# Patient Record
Sex: MALE | Race: WHITE | NOT HISPANIC OR LATINO | Employment: FULL TIME | URBAN - METROPOLITAN AREA
[De-identification: names, ages, dates, MRNs, and addresses within clinical notes are randomized per-mention and may not be internally consistent; named-entity substitution may affect disease eponyms.]

---

## 2019-02-25 ENCOUNTER — TELEPHONE (OUTPATIENT)
Dept: FAMILY MEDICINE | Facility: CLINIC | Age: 24
End: 2019-02-25

## 2019-02-25 NOTE — TELEPHONE ENCOUNTER
----- Message from Lita Rawls sent at 2/25/2019  4:12 PM CST -----  Regarding: RX REFILL  Contact: [phone]  dextroamphetamine-amphetamine ER 30 mg 24hr capsule,extend release

## 2019-04-12 RX ORDER — DEXTROAMPHETAMINE SACCHARATE, AMPHETAMINE ASPARTATE MONOHYDRATE, DEXTROAMPHETAMINE SULFATE AND AMPHETAMINE SULFATE 7.5; 7.5; 7.5; 7.5 MG/1; MG/1; MG/1; MG/1
30 CAPSULE, EXTENDED RELEASE ORAL EVERY MORNING
Qty: 30 CAPSULE | Refills: 0 | Status: SHIPPED | OUTPATIENT
Start: 2019-04-12 | End: 2019-05-29 | Stop reason: SDUPTHER

## 2019-05-29 ENCOUNTER — TELEPHONE (OUTPATIENT)
Dept: FAMILY MEDICINE | Facility: CLINIC | Age: 24
End: 2019-05-29

## 2019-05-29 RX ORDER — DEXTROAMPHETAMINE SACCHARATE, AMPHETAMINE ASPARTATE MONOHYDRATE, DEXTROAMPHETAMINE SULFATE AND AMPHETAMINE SULFATE 7.5; 7.5; 7.5; 7.5 MG/1; MG/1; MG/1; MG/1
30 CAPSULE, EXTENDED RELEASE ORAL EVERY MORNING
Qty: 30 CAPSULE | Refills: 0 | Status: SHIPPED | OUTPATIENT
Start: 2019-05-29 | End: 2019-07-10 | Stop reason: SDUPTHER

## 2019-07-10 ENCOUNTER — TELEPHONE (OUTPATIENT)
Dept: FAMILY MEDICINE | Facility: CLINIC | Age: 24
End: 2019-07-10

## 2019-07-10 DIAGNOSIS — F90.0 ADHD (ATTENTION DEFICIT HYPERACTIVITY DISORDER), INATTENTIVE TYPE: Primary | ICD-10-CM

## 2019-07-10 RX ORDER — DEXTROAMPHETAMINE SACCHARATE, AMPHETAMINE ASPARTATE MONOHYDRATE, DEXTROAMPHETAMINE SULFATE AND AMPHETAMINE SULFATE 7.5; 7.5; 7.5; 7.5 MG/1; MG/1; MG/1; MG/1
30 CAPSULE, EXTENDED RELEASE ORAL EVERY MORNING
Qty: 30 CAPSULE | Refills: 0 | Status: SHIPPED | OUTPATIENT
Start: 2019-07-10 | End: 2019-08-12 | Stop reason: SDUPTHER

## 2019-08-12 ENCOUNTER — OFFICE VISIT (OUTPATIENT)
Dept: FAMILY MEDICINE | Facility: CLINIC | Age: 24
End: 2019-08-12
Payer: COMMERCIAL

## 2019-08-12 VITALS
WEIGHT: 218 LBS | RESPIRATION RATE: 16 BRPM | SYSTOLIC BLOOD PRESSURE: 122 MMHG | HEART RATE: 64 BPM | DIASTOLIC BLOOD PRESSURE: 71 MMHG | BODY MASS INDEX: 32.29 KG/M2 | OXYGEN SATURATION: 98 % | TEMPERATURE: 99 F | HEIGHT: 69 IN

## 2019-08-12 DIAGNOSIS — F90.0 ADHD (ATTENTION DEFICIT HYPERACTIVITY DISORDER), INATTENTIVE TYPE: ICD-10-CM

## 2019-08-12 DIAGNOSIS — R10.84 ABDOMINAL DISCOMFORT, GENERALIZED: Primary | ICD-10-CM

## 2019-08-12 DIAGNOSIS — R14.2 ERUCTATION: ICD-10-CM

## 2019-08-12 PROCEDURE — 99213 PR OFFICE/OUTPT VISIT, EST, LEVL III, 20-29 MIN: ICD-10-PCS | Mod: S$GLB,,, | Performed by: NURSE PRACTITIONER

## 2019-08-12 PROCEDURE — 99213 OFFICE O/P EST LOW 20 MIN: CPT | Mod: S$GLB,,, | Performed by: NURSE PRACTITIONER

## 2019-08-12 PROCEDURE — 3008F PR BODY MASS INDEX (BMI) DOCUMENTED: ICD-10-PCS | Mod: CPTII,S$GLB,, | Performed by: NURSE PRACTITIONER

## 2019-08-12 PROCEDURE — 3008F BODY MASS INDEX DOCD: CPT | Mod: CPTII,S$GLB,, | Performed by: NURSE PRACTITIONER

## 2019-08-12 RX ORDER — DEXTROAMPHETAMINE SACCHARATE, AMPHETAMINE ASPARTATE MONOHYDRATE, DEXTROAMPHETAMINE SULFATE AND AMPHETAMINE SULFATE 7.5; 7.5; 7.5; 7.5 MG/1; MG/1; MG/1; MG/1
30 CAPSULE, EXTENDED RELEASE ORAL EVERY MORNING
Qty: 30 CAPSULE | Refills: 0 | Status: SHIPPED | OUTPATIENT
Start: 2019-08-12 | End: 2020-01-14 | Stop reason: SDUPTHER

## 2019-08-12 RX ORDER — DICYCLOMINE HYDROCHLORIDE 20 MG/1
20 TABLET ORAL 4 TIMES DAILY PRN
Qty: 30 TABLET | Refills: 0 | Status: SHIPPED | OUTPATIENT
Start: 2019-08-12 | End: 2019-09-11

## 2019-08-12 RX ORDER — ONDANSETRON 8 MG/1
8 TABLET, ORALLY DISINTEGRATING ORAL 3 TIMES DAILY PRN
Qty: 6 TABLET | Refills: 0 | Status: SHIPPED | OUTPATIENT
Start: 2019-08-12 | End: 2022-03-18

## 2019-08-12 NOTE — PROGRESS NOTES
Subjective:      Patient ID: Marc Gonzalez is a 24 y.o. male.    Chief Complaint: Abdominal Pain (Started last night. No stomach virus symptoms- feels like a pressure in mid abd.)      Patient is here for epigastric discomfort that started last night.  He reports pain that felt like pressure in the epigastric region.  The pain has improved significantly.  Associated symptoms include belching last night.  Denies diarrhea, fever, chills, body aches.  He denies NSAID use.  Denies EtOH use. No recent sick contacts. BM x 2 this am. Denies flatulence. Denies GERD. Has not eaten today d/t being nauseated. Denies flank pain.       Requesting adderall refill.       ROS:   Review of Systems   Constitutional: Negative.    Respiratory: Negative.    Cardiovascular: Negative.    Gastrointestinal: Positive for nausea. Negative for abdominal distention, anal bleeding, blood in stool, constipation, diarrhea, rectal pain and vomiting.   Genitourinary: Negative.    Musculoskeletal: Negative.    Skin: Negative.      Objective:   Physical Exam   Constitutional: He is oriented to person, place, and time. Vital signs are normal. He appears well-developed and well-nourished. He is active.  Non-toxic appearance. He does not have a sickly appearance. He does not appear ill. No distress.   HENT:   Head: Normocephalic and atraumatic.   Right Ear: Hearing and tympanic membrane normal.   Left Ear: Hearing and tympanic membrane normal.   Nose: Nose normal.   Mouth/Throat: Uvula is midline, oropharynx is clear and moist and mucous membranes are normal.   Eyes: Pupils are equal, round, and reactive to light. Conjunctivae and EOM are normal. No scleral icterus.   Neck: Full passive range of motion without pain. Neck supple. Carotid bruit is not present. No thyroid mass and no thyromegaly present.   Cardiovascular: Normal rate, regular rhythm and normal heart sounds.   Pulmonary/Chest: Effort normal and breath sounds normal.   Abdominal: Soft.  Normal appearance. He exhibits no distension and no ascites. Bowel sounds are increased. There is no hepatosplenomegaly. There is generalized tenderness. There is no rigidity, no rebound, no guarding, no CVA tenderness, no tenderness at McBurney's point and negative Craven's sign. No hernia.   Musculoskeletal: Normal range of motion. He exhibits no edema.   Lymphadenopathy:     He has no cervical adenopathy.        Right: No supraclavicular adenopathy present.        Left: No supraclavicular adenopathy present.   Neurological: He is alert and oriented to person, place, and time. He has normal strength.   Skin: Skin is warm, dry and intact. Capillary refill takes less than 2 seconds.   Psychiatric: He has a normal mood and affect. His speech is normal and behavior is normal. Judgment and thought content normal.   Nursing note and vitals reviewed.    Assessment:     1. Abdominal discomfort, generalized    2. Eructation    3. ADHD (attention deficit hyperactivity disorder), inattentive type      Plan:     Problem List Items Addressed This Visit     None      Visit Diagnoses     Abdominal discomfort, generalized    -  Primary    Suspect viral. Will treat symptoms. Instructed to RTC if symptoms do not improve by Friday or if RLQ pain or fever develops.     Relevant Medications    dicyclomine (BENTYL) 20 mg tablet    ondansetron (ZOFRAN-ODT) 8 MG TbDL    Eructation        Continue diet as tolerated. Leivasy.     ADHD (attention deficit hyperactivity disorder), inattentive type        Relevant Medications    dextroamphetamine-amphetamine (ADDERALL XR) 30 MG 24 hr capsule

## 2019-08-12 NOTE — LETTER
August 12, 2019      [facility]  [address] [facility] [address]  Phone: [phone]  Fax: [phone]       Patient: Marc Gonzalez   YOB: 1995  Date of Visit: 08/12/2019    To Whom It May Concern:    Renetta Gonzalez  was at [facility] on 08/12/2019. He may return to work on 08/13/2019 with no restrictions. If you have any questions or concerns, or if I can be of further assistance, please do not hesitate to contact me.    Sincerely,    Sara Darling MA

## 2020-01-14 ENCOUNTER — OFFICE VISIT (OUTPATIENT)
Dept: FAMILY MEDICINE | Facility: CLINIC | Age: 25
End: 2020-01-14
Payer: COMMERCIAL

## 2020-01-14 VITALS
RESPIRATION RATE: 16 BRPM | DIASTOLIC BLOOD PRESSURE: 60 MMHG | WEIGHT: 223 LBS | HEIGHT: 69 IN | SYSTOLIC BLOOD PRESSURE: 120 MMHG | BODY MASS INDEX: 33.03 KG/M2

## 2020-01-14 DIAGNOSIS — F90.0 ADHD (ATTENTION DEFICIT HYPERACTIVITY DISORDER), INATTENTIVE TYPE: ICD-10-CM

## 2020-01-14 PROBLEM — E83.52 HYPERCALCEMIA: Status: RESOLVED | Noted: 2020-01-14 | Resolved: 2020-01-14

## 2020-01-14 PROBLEM — R79.9 ABNORMAL SERUM TOTAL PROTEIN LEVEL: Status: RESOLVED | Noted: 2020-01-14 | Resolved: 2020-01-14

## 2020-01-14 PROCEDURE — 3008F PR BODY MASS INDEX (BMI) DOCUMENTED: ICD-10-PCS | Mod: CPTII,S$GLB,, | Performed by: NURSE PRACTITIONER

## 2020-01-14 PROCEDURE — 99213 OFFICE O/P EST LOW 20 MIN: CPT | Mod: S$GLB,,, | Performed by: NURSE PRACTITIONER

## 2020-01-14 PROCEDURE — 3008F BODY MASS INDEX DOCD: CPT | Mod: CPTII,S$GLB,, | Performed by: NURSE PRACTITIONER

## 2020-01-14 PROCEDURE — 99213 PR OFFICE/OUTPT VISIT, EST, LEVL III, 20-29 MIN: ICD-10-PCS | Mod: S$GLB,,, | Performed by: NURSE PRACTITIONER

## 2020-01-14 RX ORDER — DEXTROAMPHETAMINE SACCHARATE, AMPHETAMINE ASPARTATE MONOHYDRATE, DEXTROAMPHETAMINE SULFATE AND AMPHETAMINE SULFATE 7.5; 7.5; 7.5; 7.5 MG/1; MG/1; MG/1; MG/1
30 CAPSULE, EXTENDED RELEASE ORAL EVERY MORNING
Qty: 30 CAPSULE | Refills: 0 | Status: SHIPPED | OUTPATIENT
Start: 2020-01-14 | End: 2020-05-26 | Stop reason: SDUPTHER

## 2020-01-14 NOTE — ASSESSMENT & PLAN NOTE
No side effects from medicine.  Patient is doing well on 30 mg Adderall XR capsule.  He will return to clinic in 1 year.

## 2020-01-14 NOTE — PROGRESS NOTES
Subjective:      Patient ID: Marc Gonzalez is a 24 y.o. male.    Chief Complaint: Follow-up      24-year-old male with a history of ADHD here for his yearly follow-up.  He reports no acute issues at this time.  He is currently on Adderall 30 mg XR capsule.  He reports no side effects from the medication.  Maintaining a healthy weight and diet.  Denies chest pain, palpitations, decreased appetite, constipation, diarrhea.  No acute issues reported this time.     Past Medical History:   Diagnosis Date    Abnormal serum total protein level     ADHD (attention deficit hyperactivity disorder), inattentive type     Hypercalcemia       Social History     Socioeconomic History    Marital status:      Spouse name: Not on file    Number of children: Not on file    Years of education: Not on file    Highest education level: Not on file   Occupational History    Not on file   Social Needs    Financial resource strain: Not on file    Food insecurity:     Worry: Not on file     Inability: Not on file    Transportation needs:     Medical: Not on file     Non-medical: Not on file   Tobacco Use    Smoking status: Never Smoker    Smokeless tobacco: Never Used   Substance and Sexual Activity    Alcohol use: Yes     Frequency: Monthly or less    Drug use: Never    Sexual activity: Not on file   Lifestyle    Physical activity:     Days per week: Not on file     Minutes per session: Not on file    Stress: Not on file   Relationships    Social connections:     Talks on phone: Not on file     Gets together: Not on file     Attends Shinto service: Not on file     Active member of club or organization: Not on file     Attends meetings of clubs or organizations: Not on file     Relationship status: Not on file   Other Topics Concern    Not on file   Social History Narrative    Not on file      Family History   Problem Relation Age of Onset    No Known Problems Mother     No Known Problems Father     No  Known Problems Sister     No Known Problems Brother     No Known Problems Maternal Grandmother     No Known Problems Maternal Grandfather     No Known Problems Paternal Grandmother     No Known Problems Paternal Grandfather         ROS:   Review of Systems   Constitutional: Negative for activity change, appetite change, chills, fatigue, fever and unexpected weight change.   HENT: Negative for congestion, dental problem, hearing loss, mouth sores, sore throat, trouble swallowing and voice change.    Eyes: Negative for pain, redness and visual disturbance.   Respiratory: Negative for cough, shortness of breath and wheezing.    Cardiovascular: Negative for chest pain and leg swelling.   Gastrointestinal: Negative for abdominal distention, abdominal pain, diarrhea, nausea and vomiting.   Endocrine: Negative for polyuria.   Genitourinary: Negative for decreased urine volume, dysuria and frequency.   Musculoskeletal: Negative for joint swelling and neck stiffness.   Skin: Negative for rash.   Allergic/Immunologic: Negative for immunocompromised state.   Neurological: Negative for dizziness, weakness and headaches.   Hematological: Negative for adenopathy.   Psychiatric/Behavioral: Negative for agitation, confusion, hallucinations and suicidal ideas. The patient is not nervous/anxious.    All other systems reviewed and are negative.    Objective:   Physical Exam   Constitutional: He is oriented to person, place, and time. He appears well-developed and well-nourished. No distress.   HENT:   Head: Normocephalic and atraumatic.   Nose: Nose normal.   Mouth/Throat: Oropharynx is clear and moist.   Eyes: Pupils are equal, round, and reactive to light. No scleral icterus.   Neck: Normal range of motion. Carotid bruit is not present. No thyromegaly present.   Cardiovascular: Normal rate and normal heart sounds.   No murmur heard.  Pulmonary/Chest: Effort normal and breath sounds normal.   Musculoskeletal: He exhibits no  edema.   Neurological: He is alert and oriented to person, place, and time.   Skin: Skin is warm.   Psychiatric: He has a normal mood and affect. His speech is normal and behavior is normal. Judgment and thought content normal. Cognition and memory are normal.   Nursing note and vitals reviewed.    Assessment:     1. ADHD (attention deficit hyperactivity disorder), inattentive type      Plan:     Problem List Items Addressed This Visit        Psychiatric    ADHD (attention deficit hyperactivity disorder), inattentive type    Current Assessment & Plan     No side effects from medicine.  Patient is doing well on 30 mg Adderall XR capsule.  He will return to clinic in 1 year.          Relevant Medications    dextroamphetamine-amphetamine (ADDERALL XR) 30 MG 24 hr capsule          Follow up:     There are no Patient Instructions on file for this visit.     Follow up in about 1 year (around 1/14/2021), or if symptoms worsen or fail to improve.

## 2020-05-26 ENCOUNTER — OFFICE VISIT (OUTPATIENT)
Dept: FAMILY MEDICINE | Facility: CLINIC | Age: 25
End: 2020-05-26
Payer: COMMERCIAL

## 2020-05-26 VITALS
DIASTOLIC BLOOD PRESSURE: 72 MMHG | SYSTOLIC BLOOD PRESSURE: 102 MMHG | RESPIRATION RATE: 16 BRPM | BODY MASS INDEX: 28.66 KG/M2 | WEIGHT: 204.69 LBS | HEIGHT: 71 IN | TEMPERATURE: 98 F | HEART RATE: 87 BPM | OXYGEN SATURATION: 98 %

## 2020-05-26 DIAGNOSIS — B34.9 VIRAL SYNDROME: ICD-10-CM

## 2020-05-26 DIAGNOSIS — F90.0 ADHD (ATTENTION DEFICIT HYPERACTIVITY DISORDER), INATTENTIVE TYPE: ICD-10-CM

## 2020-05-26 DIAGNOSIS — F90.9 ATTENTION DEFICIT HYPERACTIVITY DISORDER (ADHD), UNSPECIFIED ADHD TYPE: ICD-10-CM

## 2020-05-26 DIAGNOSIS — R68.89 FLU-LIKE SYMPTOMS: Primary | ICD-10-CM

## 2020-05-26 PROCEDURE — 99214 PR OFFICE/OUTPT VISIT, EST, LEVL IV, 30-39 MIN: ICD-10-PCS | Mod: S$GLB,,, | Performed by: NURSE PRACTITIONER

## 2020-05-26 PROCEDURE — 3008F BODY MASS INDEX DOCD: CPT | Mod: CPTII,S$GLB,, | Performed by: NURSE PRACTITIONER

## 2020-05-26 PROCEDURE — 99214 OFFICE O/P EST MOD 30 MIN: CPT | Mod: S$GLB,,, | Performed by: NURSE PRACTITIONER

## 2020-05-26 PROCEDURE — 3008F PR BODY MASS INDEX (BMI) DOCUMENTED: ICD-10-PCS | Mod: CPTII,S$GLB,, | Performed by: NURSE PRACTITIONER

## 2020-05-26 RX ORDER — DEXTROAMPHETAMINE SACCHARATE, AMPHETAMINE ASPARTATE MONOHYDRATE, DEXTROAMPHETAMINE SULFATE AND AMPHETAMINE SULFATE 7.5; 7.5; 7.5; 7.5 MG/1; MG/1; MG/1; MG/1
30 CAPSULE, EXTENDED RELEASE ORAL EVERY MORNING
Qty: 30 CAPSULE | Refills: 0 | Status: SHIPPED | OUTPATIENT
Start: 2020-05-26 | End: 2020-07-06 | Stop reason: SDUPTHER

## 2020-05-26 NOTE — PROGRESS NOTES
Subjective:      Patient ID: Marc Gonzalez is a 25 y.o. male.    Chief Complaint: Fever      25-year-old male here today with complaints of flu-like symptoms.  He reports body aches for the past day and fever.  Fever was 101 yesterday.  Has been taking Tylenol since that time.  No fever today but has taken Tylenol.  Denies cough, chills, sore throat, rhinorrhea.  Positive for myalgia and lower back pain.  Otherwise no other issues reported.     States that his wife was recently tested for COVID as well as his kid.  Neither tested positive for flu or strep.  He reports taking 2 doses of azithromycin from his wife yesterday.       Also requesting refill for Adderall    Past Medical History:   Diagnosis Date    Abnormal serum total protein level     ADHD (attention deficit hyperactivity disorder), inattentive type     Hypercalcemia       Social History     Socioeconomic History    Marital status:      Spouse name: Not on file    Number of children: Not on file    Years of education: Not on file    Highest education level: Not on file   Occupational History    Not on file   Social Needs    Financial resource strain: Not on file    Food insecurity:     Worry: Not on file     Inability: Not on file    Transportation needs:     Medical: Not on file     Non-medical: Not on file   Tobacco Use    Smoking status: Never Smoker    Smokeless tobacco: Never Used   Substance and Sexual Activity    Alcohol use: Yes     Frequency: Monthly or less    Drug use: Never    Sexual activity: Not on file   Lifestyle    Physical activity:     Days per week: Not on file     Minutes per session: Not on file    Stress: Not on file   Relationships    Social connections:     Talks on phone: Not on file     Gets together: Not on file     Attends Yazidism service: Not on file     Active member of club or organization: Not on file     Attends meetings of clubs or organizations: Not on file     Relationship status: Not  on file   Other Topics Concern    Not on file   Social History Narrative    Not on file      Family History   Problem Relation Age of Onset    No Known Problems Mother     No Known Problems Father     No Known Problems Sister     No Known Problems Brother     No Known Problems Maternal Grandmother     No Known Problems Maternal Grandfather     No Known Problems Paternal Grandmother     No Known Problems Paternal Grandfather         ROS:   Review of Systems   Constitutional: Positive for fatigue and fever. Negative for activity change, appetite change, chills, diaphoresis and unexpected weight change.   HENT: Negative for congestion, dental problem, hearing loss, mouth sores, sore throat, trouble swallowing and voice change.    Eyes: Negative for pain, redness and visual disturbance.   Respiratory: Negative for cough, shortness of breath and wheezing.    Cardiovascular: Negative for chest pain and leg swelling.   Gastrointestinal: Negative for abdominal distention, abdominal pain, diarrhea, nausea and vomiting.   Endocrine: Negative for polyuria.   Genitourinary: Negative for decreased urine volume, dysuria and frequency.   Musculoskeletal: Positive for back pain and myalgias. Negative for joint swelling and neck stiffness.   Skin: Negative for rash.   Allergic/Immunologic: Negative for immunocompromised state.   Neurological: Negative for dizziness, weakness and headaches.   Hematological: Negative for adenopathy.   Psychiatric/Behavioral: Negative for agitation, confusion, hallucinations and suicidal ideas. The patient is not nervous/anxious.    All other systems reviewed and are negative.    Objective:   Physical Exam   Constitutional: He is oriented to person, place, and time. Vital signs are normal. He appears well-developed and well-nourished. He is active and cooperative.  Non-toxic appearance. He does not have a sickly appearance. He does not appear ill. No distress.   HENT:   Head: Normocephalic  and atraumatic.   Nose: Nose normal.   Mouth/Throat: Oropharynx is clear and moist. No oropharyngeal exudate.   Eyes: Pupils are equal, round, and reactive to light. No scleral icterus.   Neck: Normal range of motion. Carotid bruit is not present. No thyromegaly present.   Cardiovascular: Normal rate and normal heart sounds.   No murmur heard.  Pulmonary/Chest: Effort normal and breath sounds normal.   Musculoskeletal: He exhibits no edema.   Neurological: He is alert and oriented to person, place, and time.   Skin: Skin is warm.   Psychiatric: He has a normal mood and affect. His speech is normal and behavior is normal. Judgment and thought content normal. Cognition and memory are normal.   Nursing note and vitals reviewed.    Assessment:     1. Flu-like symptoms    2. Viral syndrome    3. Attention deficit hyperactivity disorder (ADHD), unspecified ADHD type    4. ADHD (attention deficit hyperactivity disorder), inattentive type      No images are attached to the encounter.   Plan:     Problem List Items Addressed This Visit        Psychiatric    ADHD (attention deficit hyperactivity disorder), inattentive type    Relevant Medications    dextroamphetamine-amphetamine (ADDERALL XR) 30 MG 24 hr capsule      Other Visit Diagnoses     Flu-like symptoms    -  Primary    Relevant Orders    POCT Rapid Strep A    POCT Influenza A/B    COVID-19 Routine Screening    Viral syndrome        Attention deficit hyperactivity disorder (ADHD), unspecified ADHD type           Patient was negative for flu.  Negative for strep.  Doubtful that he has COVID-19, however, tested him regardless.  He was instructed to quarantine until his results have come back.  Will notify him once they are back.  Patient given work excuse.  Patient instructed to return to clinic if further symptoms develop.      Follow up:     There are no Patient Instructions on file for this visit.     Follow up if symptoms worsen or fail to improve.

## 2020-06-01 ENCOUNTER — TELEPHONE (OUTPATIENT)
Dept: FAMILY MEDICINE | Facility: CLINIC | Age: 25
End: 2020-06-01

## 2020-06-01 NOTE — TELEPHONE ENCOUNTER
Pt notified.    Valentino Hinson NP   Sent: Mon June 01, 2020  1:10 PM   To: Sara Darling MA          Message     Negative COVID. Needs to return to work.

## 2020-07-06 DIAGNOSIS — F90.0 ADHD (ATTENTION DEFICIT HYPERACTIVITY DISORDER), INATTENTIVE TYPE: ICD-10-CM

## 2020-07-06 RX ORDER — DEXTROAMPHETAMINE SACCHARATE, AMPHETAMINE ASPARTATE MONOHYDRATE, DEXTROAMPHETAMINE SULFATE AND AMPHETAMINE SULFATE 7.5; 7.5; 7.5; 7.5 MG/1; MG/1; MG/1; MG/1
30 CAPSULE, EXTENDED RELEASE ORAL EVERY MORNING
Qty: 30 CAPSULE | Refills: 0 | Status: SHIPPED | OUTPATIENT
Start: 2020-07-06 | End: 2020-08-14 | Stop reason: SDUPTHER

## 2020-07-06 NOTE — TELEPHONE ENCOUNTER
----- Message from Robson Barton sent at 7/6/2020 10:21 AM CDT -----  Regarding: Rx  Contact: PAIGE CANALES [[id number]]  Type:  RX Refill Request    Who Called: pt  Refill or New Rx:refill  RX Name and Strength:dextroamphetamine-amphetamine (ADDERALL XR) 30 MG 24 hr capsule  How is the patient currently taking it? (ex. 1XDay):1x  Is this a 30 day or 90 day RX: 30 day  Preferred Pharmacy with phone number:  [facility] [address] [place], [place] - [address] [address]  Phone: [phone] Fax: [phone]  Local or Mail Order:local   Ordering Provider:Joya  Would the patient rather a call back or a response via [facility]? Call back  Best Call Back Number:[phone]  Additional Information:

## 2020-08-14 DIAGNOSIS — F90.0 ADHD (ATTENTION DEFICIT HYPERACTIVITY DISORDER), INATTENTIVE TYPE: Primary | ICD-10-CM

## 2020-08-14 RX ORDER — DEXTROAMPHETAMINE SACCHARATE, AMPHETAMINE ASPARTATE MONOHYDRATE, DEXTROAMPHETAMINE SULFATE AND AMPHETAMINE SULFATE 7.5; 7.5; 7.5; 7.5 MG/1; MG/1; MG/1; MG/1
30 CAPSULE, EXTENDED RELEASE ORAL EVERY MORNING
Qty: 30 CAPSULE | Refills: 0 | Status: SHIPPED | OUTPATIENT
Start: 2020-08-14 | End: 2020-09-28 | Stop reason: SDUPTHER

## 2020-08-14 NOTE — TELEPHONE ENCOUNTER
----- Message from Ashkan Pierce sent at 8/14/2020 10:39 AM CDT -----  Regarding: Refill  Type:  RX Refill Request    Who Called: Marc  Refill or New Rx:Refill  RX Name and Strength:Adderall 30mg  How is the patient currently taking it? (ex. 1XDay):1xday  Is this a 30 day or 90 day RX:30  Preferred Pharmacy with phone number:  [facility], [place] - [address] [facility]  [address] [facility] [address]  Phone: [phone] Fax: [phone]  Local or Mail Order:Local  Ordering Provider:Karthik Hinson  Would the patient rather a call back or a response via [facility]? Call back  Best Call Back Number:[phone] (home)   Additional Information: na

## 2020-09-28 DIAGNOSIS — F90.0 ADHD (ATTENTION DEFICIT HYPERACTIVITY DISORDER), INATTENTIVE TYPE: ICD-10-CM

## 2020-09-28 RX ORDER — DEXTROAMPHETAMINE SACCHARATE, AMPHETAMINE ASPARTATE MONOHYDRATE, DEXTROAMPHETAMINE SULFATE AND AMPHETAMINE SULFATE 7.5; 7.5; 7.5; 7.5 MG/1; MG/1; MG/1; MG/1
30 CAPSULE, EXTENDED RELEASE ORAL EVERY MORNING
Qty: 30 CAPSULE | Refills: 0 | Status: SHIPPED | OUTPATIENT
Start: 2020-09-28 | End: 2020-11-18 | Stop reason: SDUPTHER

## 2020-09-28 NOTE — TELEPHONE ENCOUNTER
----- Message from Jamia Morales sent at 9/24/2020  9:55 AM CDT -----  Regarding: patient refill  Contact: patient  Type:  RX Refill Request    Who Called: PATIENT  Refill or New Rx:REFILL  RX Name and Strength: ADDERALL 30MG  How is the patient currently taking it? (ex. 1XDay):1XDAY  Is this a 30 day or 90 day RX: 30DAY  Preferred Pharmacy with phone number:[facility]  Local or Mail Order:LOCAL  Ordering Provider:Caitlin Mckeon  Would the patient rather a call back or a response via [facility]? CALL BACK  Best Call Back Number:[phone]   Additional Information: N/A

## 2020-11-18 DIAGNOSIS — F90.0 ADHD (ATTENTION DEFICIT HYPERACTIVITY DISORDER), INATTENTIVE TYPE: ICD-10-CM

## 2020-11-18 RX ORDER — DEXTROAMPHETAMINE SACCHARATE, AMPHETAMINE ASPARTATE MONOHYDRATE, DEXTROAMPHETAMINE SULFATE AND AMPHETAMINE SULFATE 7.5; 7.5; 7.5; 7.5 MG/1; MG/1; MG/1; MG/1
30 CAPSULE, EXTENDED RELEASE ORAL EVERY MORNING
Qty: 30 CAPSULE | Refills: 0 | Status: SHIPPED | OUTPATIENT
Start: 2020-11-18 | End: 2021-01-11 | Stop reason: SDUPTHER

## 2020-11-18 NOTE — TELEPHONE ENCOUNTER
----- Message from Jamia Morales sent at 11/18/2020 10:30 AM CST -----  Regarding: patient refill  Contact: patient  Type:  RX Refill Request    Who Called: patient  Refill or New Rx:refill  RX Name and Strength: Adderall 30mg  How is the patient currently taking it? (ex. 1XDay):1xday  Is this a 30 day or 90 day RX:30day  Preferred Pharmacy with phone number: [facility] in [place]  Local or Mail Order:local  Ordering Provider:Valentino Hinson  Would the patient rather a call back or a response via [facility]? Call back  Best Call Back Number:[phone]   Additional Information: n/a

## 2021-01-11 DIAGNOSIS — F90.0 ADHD (ATTENTION DEFICIT HYPERACTIVITY DISORDER), INATTENTIVE TYPE: ICD-10-CM

## 2021-01-11 RX ORDER — DEXTROAMPHETAMINE SACCHARATE, AMPHETAMINE ASPARTATE MONOHYDRATE, DEXTROAMPHETAMINE SULFATE AND AMPHETAMINE SULFATE 7.5; 7.5; 7.5; 7.5 MG/1; MG/1; MG/1; MG/1
30 CAPSULE, EXTENDED RELEASE ORAL EVERY MORNING
Qty: 30 CAPSULE | Refills: 0 | Status: SHIPPED | OUTPATIENT
Start: 2021-01-11 | End: 2021-03-04 | Stop reason: SDUPTHER

## 2021-03-04 DIAGNOSIS — F90.0 ADHD (ATTENTION DEFICIT HYPERACTIVITY DISORDER), INATTENTIVE TYPE: ICD-10-CM

## 2021-03-04 RX ORDER — DEXTROAMPHETAMINE SACCHARATE, AMPHETAMINE ASPARTATE MONOHYDRATE, DEXTROAMPHETAMINE SULFATE AND AMPHETAMINE SULFATE 7.5; 7.5; 7.5; 7.5 MG/1; MG/1; MG/1; MG/1
30 CAPSULE, EXTENDED RELEASE ORAL EVERY MORNING
Qty: 30 CAPSULE | Refills: 0 | Status: SHIPPED | OUTPATIENT
Start: 2021-03-04 | End: 2021-04-15 | Stop reason: SDUPTHER

## 2021-04-15 DIAGNOSIS — F90.0 ADHD (ATTENTION DEFICIT HYPERACTIVITY DISORDER), INATTENTIVE TYPE: ICD-10-CM

## 2021-04-15 RX ORDER — DEXTROAMPHETAMINE SACCHARATE, AMPHETAMINE ASPARTATE MONOHYDRATE, DEXTROAMPHETAMINE SULFATE AND AMPHETAMINE SULFATE 7.5; 7.5; 7.5; 7.5 MG/1; MG/1; MG/1; MG/1
30 CAPSULE, EXTENDED RELEASE ORAL EVERY MORNING
Qty: 30 CAPSULE | Refills: 0 | Status: SHIPPED | OUTPATIENT
Start: 2021-04-15 | End: 2021-05-28 | Stop reason: SDUPTHER

## 2021-05-28 DIAGNOSIS — F90.0 ADHD (ATTENTION DEFICIT HYPERACTIVITY DISORDER), INATTENTIVE TYPE: ICD-10-CM

## 2021-05-28 RX ORDER — DEXTROAMPHETAMINE SACCHARATE, AMPHETAMINE ASPARTATE MONOHYDRATE, DEXTROAMPHETAMINE SULFATE AND AMPHETAMINE SULFATE 7.5; 7.5; 7.5; 7.5 MG/1; MG/1; MG/1; MG/1
30 CAPSULE, EXTENDED RELEASE ORAL EVERY MORNING
Qty: 30 CAPSULE | Refills: 0 | Status: SHIPPED | OUTPATIENT
Start: 2021-05-28 | End: 2021-07-02 | Stop reason: SDUPTHER

## 2021-07-02 DIAGNOSIS — F90.0 ADHD (ATTENTION DEFICIT HYPERACTIVITY DISORDER), INATTENTIVE TYPE: ICD-10-CM

## 2021-07-02 RX ORDER — DEXTROAMPHETAMINE SACCHARATE, AMPHETAMINE ASPARTATE MONOHYDRATE, DEXTROAMPHETAMINE SULFATE AND AMPHETAMINE SULFATE 7.5; 7.5; 7.5; 7.5 MG/1; MG/1; MG/1; MG/1
30 CAPSULE, EXTENDED RELEASE ORAL EVERY MORNING
Qty: 30 CAPSULE | Refills: 0 | Status: SHIPPED | OUTPATIENT
Start: 2021-07-02 | End: 2021-08-11 | Stop reason: SDUPTHER

## 2021-08-11 DIAGNOSIS — F90.0 ADHD (ATTENTION DEFICIT HYPERACTIVITY DISORDER), INATTENTIVE TYPE: ICD-10-CM

## 2021-08-11 RX ORDER — DEXTROAMPHETAMINE SACCHARATE, AMPHETAMINE ASPARTATE MONOHYDRATE, DEXTROAMPHETAMINE SULFATE AND AMPHETAMINE SULFATE 7.5; 7.5; 7.5; 7.5 MG/1; MG/1; MG/1; MG/1
30 CAPSULE, EXTENDED RELEASE ORAL EVERY MORNING
Qty: 30 CAPSULE | Refills: 0 | Status: SHIPPED | OUTPATIENT
Start: 2021-08-11 | End: 2021-09-14 | Stop reason: SDUPTHER

## 2021-09-14 DIAGNOSIS — F90.0 ADHD (ATTENTION DEFICIT HYPERACTIVITY DISORDER), INATTENTIVE TYPE: ICD-10-CM

## 2021-09-14 RX ORDER — DEXTROAMPHETAMINE SACCHARATE, AMPHETAMINE ASPARTATE MONOHYDRATE, DEXTROAMPHETAMINE SULFATE AND AMPHETAMINE SULFATE 7.5; 7.5; 7.5; 7.5 MG/1; MG/1; MG/1; MG/1
30 CAPSULE, EXTENDED RELEASE ORAL EVERY MORNING
Qty: 30 CAPSULE | Refills: 0 | Status: SHIPPED | OUTPATIENT
Start: 2021-09-14 | End: 2021-10-11 | Stop reason: SDUPTHER

## 2021-10-11 DIAGNOSIS — F90.0 ADHD (ATTENTION DEFICIT HYPERACTIVITY DISORDER), INATTENTIVE TYPE: ICD-10-CM

## 2021-10-11 RX ORDER — DEXTROAMPHETAMINE SACCHARATE, AMPHETAMINE ASPARTATE MONOHYDRATE, DEXTROAMPHETAMINE SULFATE AND AMPHETAMINE SULFATE 7.5; 7.5; 7.5; 7.5 MG/1; MG/1; MG/1; MG/1
30 CAPSULE, EXTENDED RELEASE ORAL EVERY MORNING
Qty: 30 CAPSULE | Refills: 0 | Status: SHIPPED | OUTPATIENT
Start: 2021-10-11 | End: 2021-11-12 | Stop reason: SDUPTHER

## 2021-11-12 DIAGNOSIS — F90.0 ADHD (ATTENTION DEFICIT HYPERACTIVITY DISORDER), INATTENTIVE TYPE: ICD-10-CM

## 2021-11-12 RX ORDER — DEXTROAMPHETAMINE SACCHARATE, AMPHETAMINE ASPARTATE MONOHYDRATE, DEXTROAMPHETAMINE SULFATE AND AMPHETAMINE SULFATE 7.5; 7.5; 7.5; 7.5 MG/1; MG/1; MG/1; MG/1
30 CAPSULE, EXTENDED RELEASE ORAL EVERY MORNING
Qty: 30 CAPSULE | Refills: 0 | Status: SHIPPED | OUTPATIENT
Start: 2021-11-12 | End: 2021-11-19 | Stop reason: SDUPTHER

## 2021-11-19 DIAGNOSIS — F90.0 ADHD (ATTENTION DEFICIT HYPERACTIVITY DISORDER), INATTENTIVE TYPE: ICD-10-CM

## 2021-11-19 RX ORDER — DEXTROAMPHETAMINE SACCHARATE, AMPHETAMINE ASPARTATE MONOHYDRATE, DEXTROAMPHETAMINE SULFATE AND AMPHETAMINE SULFATE 7.5; 7.5; 7.5; 7.5 MG/1; MG/1; MG/1; MG/1
30 CAPSULE, EXTENDED RELEASE ORAL EVERY MORNING
Qty: 30 CAPSULE | Refills: 0 | Status: SHIPPED | OUTPATIENT
Start: 2021-11-19 | End: 2021-12-20 | Stop reason: SDUPTHER

## 2021-12-20 DIAGNOSIS — F90.0 ADHD (ATTENTION DEFICIT HYPERACTIVITY DISORDER), INATTENTIVE TYPE: ICD-10-CM

## 2021-12-21 RX ORDER — DEXTROAMPHETAMINE SACCHARATE, AMPHETAMINE ASPARTATE MONOHYDRATE, DEXTROAMPHETAMINE SULFATE AND AMPHETAMINE SULFATE 7.5; 7.5; 7.5; 7.5 MG/1; MG/1; MG/1; MG/1
30 CAPSULE, EXTENDED RELEASE ORAL EVERY MORNING
Qty: 30 CAPSULE | Refills: 0 | Status: SHIPPED | OUTPATIENT
Start: 2021-12-21 | End: 2021-12-22 | Stop reason: SDUPTHER

## 2021-12-22 DIAGNOSIS — F90.0 ADHD (ATTENTION DEFICIT HYPERACTIVITY DISORDER), INATTENTIVE TYPE: ICD-10-CM

## 2021-12-22 RX ORDER — DEXTROAMPHETAMINE SACCHARATE, AMPHETAMINE ASPARTATE MONOHYDRATE, DEXTROAMPHETAMINE SULFATE AND AMPHETAMINE SULFATE 7.5; 7.5; 7.5; 7.5 MG/1; MG/1; MG/1; MG/1
30 CAPSULE, EXTENDED RELEASE ORAL EVERY MORNING
Qty: 30 CAPSULE | Refills: 0 | Status: SHIPPED | OUTPATIENT
Start: 2021-12-22 | End: 2022-04-26 | Stop reason: SDUPTHER

## 2021-12-22 RX ORDER — DEXTROAMPHETAMINE SACCHARATE, AMPHETAMINE ASPARTATE MONOHYDRATE, DEXTROAMPHETAMINE SULFATE AND AMPHETAMINE SULFATE 7.5; 7.5; 7.5; 7.5 MG/1; MG/1; MG/1; MG/1
30 CAPSULE, EXTENDED RELEASE ORAL EVERY MORNING
Qty: 30 CAPSULE | Refills: 0 | Status: SHIPPED | OUTPATIENT
Start: 2021-12-22 | End: 2022-03-18 | Stop reason: SDUPTHER

## 2022-03-10 ENCOUNTER — TELEPHONE (OUTPATIENT)
Dept: FAMILY MEDICINE | Facility: CLINIC | Age: 27
End: 2022-03-10

## 2022-03-10 NOTE — TELEPHONE ENCOUNTER
----- Message from Edita Munson sent at 3/10/2022  1:26 PM CST -----  Contact: SELF  Type:  RX Refill Request    Who Called:  Marc Gonzalez   Refill or New Rx: REFILL  RX Name and Strength: dextroamphetamine-amphetamine (ADDERALL XR) 30 MG 24 hr capsule  How is the patient currently taking it? (ex. 1XDay): 1   Is this a 30 day or 90 day RX: 30 DAY  Preferred Pharmacy with phone number:   [id number] - [place], [place] - [address] [address]  Phone: [phone] Fax: [phone]        Local or Mail Order: LOCAL  Ordering Provider: AP  Would the patient rather a call back or a response via [facility]?  CALL BACK   Best Call Back Number: [phone]   Additional Information: N/A

## 2022-03-18 ENCOUNTER — OFFICE VISIT (OUTPATIENT)
Dept: FAMILY MEDICINE | Facility: CLINIC | Age: 27
End: 2022-03-18

## 2022-03-18 VITALS
WEIGHT: 219 LBS | SYSTOLIC BLOOD PRESSURE: 134 MMHG | BODY MASS INDEX: 30.66 KG/M2 | OXYGEN SATURATION: 100 % | DIASTOLIC BLOOD PRESSURE: 72 MMHG | HEART RATE: 104 BPM | RESPIRATION RATE: 14 BRPM | HEIGHT: 71 IN

## 2022-03-18 DIAGNOSIS — G47.00 INSOMNIA, UNSPECIFIED TYPE: ICD-10-CM

## 2022-03-18 DIAGNOSIS — R10.11 COLICKY RUQ ABDOMINAL PAIN: ICD-10-CM

## 2022-03-18 DIAGNOSIS — F90.0 ADHD (ATTENTION DEFICIT HYPERACTIVITY DISORDER), INATTENTIVE TYPE: Primary | ICD-10-CM

## 2022-03-18 DIAGNOSIS — Z00.00 ANNUAL PHYSICAL EXAM: ICD-10-CM

## 2022-03-18 PROCEDURE — 99395 PR PREVENTIVE VISIT,EST,18-39: ICD-10-PCS | Mod: S$GLB,,, | Performed by: NURSE PRACTITIONER

## 2022-03-18 PROCEDURE — 99395 PREV VISIT EST AGE 18-39: CPT | Mod: S$GLB,,, | Performed by: NURSE PRACTITIONER

## 2022-03-18 RX ORDER — DEXTROAMPHETAMINE SACCHARATE, AMPHETAMINE ASPARTATE MONOHYDRATE, DEXTROAMPHETAMINE SULFATE AND AMPHETAMINE SULFATE 7.5; 7.5; 7.5; 7.5 MG/1; MG/1; MG/1; MG/1
30 CAPSULE, EXTENDED RELEASE ORAL EVERY MORNING
Qty: 30 CAPSULE | Refills: 0 | Status: SHIPPED | OUTPATIENT
Start: 2022-03-18

## 2022-03-18 RX ORDER — TRAZODONE HYDROCHLORIDE 50 MG/1
50 TABLET ORAL NIGHTLY
Qty: 30 TABLET | Refills: 11 | Status: SHIPPED | OUTPATIENT
Start: 2022-03-18 | End: 2023-03-18

## 2022-03-18 NOTE — PROGRESS NOTES
Subjective:      Patient ID: Marc Gonzalez is a 26 y.o. male.    Chief Complaint: Annual Exam and Medication Refill      26-year-old male     Past medical history of ADHD       requesting refill for Adderall.  Doing well on current medication.  No side effects reported.  No significant weight loss.  Mental health is reportedly stable.      Patient is also here for annual wellness exam.  He reports no acute issues at this time.  He did recently go to the emergency room for colicky right upper quadrant pain.  He was diagnosed with COVID at the time.  Imaging was done which was reportedly negative.  No other issues reported this time    Past Medical History:   Diagnosis Date    Abnormal serum total protein level     ADHD (attention deficit hyperactivity disorder), inattentive type     Hypercalcemia       Social History     Socioeconomic History    Marital status:    Tobacco Use    Smoking status: Never Smoker    Smokeless tobacco: Never Used   Substance and Sexual Activity    Alcohol use: Yes    Drug use: Never      Family History   Problem Relation Age of Onset    No Known Problems Mother     No Known Problems Father     No Known Problems Sister     No Known Problems Brother     No Known Problems Maternal Grandmother     No Known Problems Maternal Grandfather     No Known Problems Paternal Grandmother     No Known Problems Paternal Grandfather         ROS:   Review of Systems   Constitutional: Negative for appetite change, chills and fever.   Eyes: Negative for visual disturbance.   Respiratory: Negative for cough, chest tightness, shortness of breath and wheezing.    Cardiovascular: Negative for chest pain.   Gastrointestinal: Negative for abdominal pain, diarrhea, nausea and vomiting.   Endocrine: Negative for polydipsia, polyphagia and polyuria.   Genitourinary: Negative for difficulty urinating, dysuria, flank pain and hematuria.   Musculoskeletal: Negative for back pain and neck pain.    Skin: Negative for rash.   Neurological: Negative for dizziness, tremors and headaches.   Hematological: Negative for adenopathy. Does not bruise/bleed easily.   Psychiatric/Behavioral: Negative for confusion, dysphoric mood, hallucinations, sleep disturbance and suicidal ideas.     Objective:   Physical Exam  Vitals and nursing note reviewed.   Constitutional:       General: He is awake. He is not in acute distress.     Appearance: Normal appearance. He is well-developed, well-groomed and normal weight. He is not ill-appearing.   Eyes:      General: No scleral icterus.  Cardiovascular:      Rate and Rhythm: Normal rate.   Pulmonary:      Effort: Pulmonary effort is normal.   Skin:     Coloration: Skin is not jaundiced.      Findings: No rash.   Neurological:      Mental Status: He is alert and oriented to person, place, and time. Mental status is at baseline.   Psychiatric:         Attention and Perception: Attention and perception normal.         Mood and Affect: Mood and affect normal.         Speech: Speech normal.         Behavior: Behavior normal. Behavior is cooperative.         Thought Content: Thought content normal.         Cognition and Memory: Cognition and memory normal.         Judgment: Judgment normal.       Assessment:     1. ADHD (attention deficit hyperactivity disorder), inattentive type    2. Colicky RUQ abdominal pain    3. Annual physical exam    4. Insomnia, unspecified type      No images are attached to the encounter.   Plan:     Problem List Items Addressed This Visit        Psychiatric    ADHD (attention deficit hyperactivity disorder), inattentive type - Primary    Current Assessment & Plan     Refill med. RTC in 6 mth           Relevant Medications    dextroamphetamine-amphetamine (ADDERALL XR) 30 MG 24 hr capsule      Other Visit Diagnoses     Colicky RUQ abdominal pain        US was unremarkable from ER.  will get HIDA scan if pain persists.     Annual physical exam        Insomnia,  unspecified type        Relevant Medications    traZODone (DESYREL) 50 MG tablet            Procedures     No visits with results within 1 Month(s) from this visit.   Latest known visit with results is:   No results found for any previous visit.        No results found in the last 30 days.     All diagnostic data (labs/imaging) was reviewed with the patient and/or family member in the room.  All questions were answered to their liking. The patient and/or family member voiced understanding of all instructions provided. Expectations regarding follow up and treatment plan were voiced and confirmed prior to departure. The patient was given orders/instructions at the end of the visit for reference. They were instructed to notify my office if they have not been contacted for imaging/referrals/labs/results in 1-2 weeks. They voiced understanding of all of the above.     Follow up:     There are no Patient Instructions on file for this visit.     Follow up in about 6 months (around 9/18/2022), or if symptoms worsen or fail to improve.

## 2022-04-26 DIAGNOSIS — F90.0 ADHD (ATTENTION DEFICIT HYPERACTIVITY DISORDER), INATTENTIVE TYPE: ICD-10-CM

## 2022-04-26 RX ORDER — DEXTROAMPHETAMINE SACCHARATE, AMPHETAMINE ASPARTATE MONOHYDRATE, DEXTROAMPHETAMINE SULFATE AND AMPHETAMINE SULFATE 7.5; 7.5; 7.5; 7.5 MG/1; MG/1; MG/1; MG/1
30 CAPSULE, EXTENDED RELEASE ORAL EVERY MORNING
Qty: 30 CAPSULE | Refills: 0 | Status: SHIPPED | OUTPATIENT
Start: 2022-04-26

## 2022-04-26 NOTE — TELEPHONE ENCOUNTER
----- Message from Litzy Giordano sent at 4/26/2022 11:26 AM CDT -----  Pt requesting refill - dextroamphetamine-amphetamine (ADDERALL XR) 30 MG 24 hr capsule    Pharmacy- [facility] [address], [place] [address]

## 2022-09-27 ENCOUNTER — TELEPHONE (OUTPATIENT)
Dept: FAMILY MEDICINE | Facility: CLINIC | Age: 27
End: 2022-09-27

## 2022-09-27 NOTE — TELEPHONE ENCOUNTER
----- Message from Sara Darling MA sent at 9/26/2022  3:36 PM CDT -----  Contact: pt    ----- Message -----  From: Iris Padilla  Sent: 9/26/2022   3:32 PM CDT  To: Joya Avelar Staff    Pt needs refill pm dextroamphetamine-amphetamine (ADDERALL XR) 30 MG 24 hr capsule  [facility]  ..        Pt cb: .[phone]